# Patient Record
Sex: FEMALE | Race: WHITE | NOT HISPANIC OR LATINO | Employment: UNEMPLOYED | RURAL
[De-identification: names, ages, dates, MRNs, and addresses within clinical notes are randomized per-mention and may not be internally consistent; named-entity substitution may affect disease eponyms.]

---

## 2019-11-06 ENCOUNTER — TRANSCRIBE ORDERS (OUTPATIENT)
Dept: PHYSICAL THERAPY | Facility: CLINIC | Age: 69
End: 2019-11-06

## 2019-11-06 DIAGNOSIS — M51.16 INTERVERTEBRAL DISC DISORDER WITH RADICULOPATHY OF LUMBAR REGION: Primary | ICD-10-CM

## 2019-11-13 ENCOUNTER — TREATMENT (OUTPATIENT)
Dept: PHYSICAL THERAPY | Facility: CLINIC | Age: 69
End: 2019-11-13

## 2019-11-13 DIAGNOSIS — M51.16 LUMBAR DISC HERNIATION WITH RADICULOPATHY: Primary | ICD-10-CM

## 2019-11-13 PROCEDURE — 97110 THERAPEUTIC EXERCISES: CPT | Performed by: PHYSICAL THERAPIST

## 2019-11-13 PROCEDURE — 97162 PT EVAL MOD COMPLEX 30 MIN: CPT | Performed by: PHYSICAL THERAPIST

## 2019-11-13 NOTE — PROGRESS NOTES
Physical Therapy Initial Evaluation and Plan of Care      Patient: Sarai Paige   : 1950  Diagnosis/ICD-10 Code:  Lumbar disc herniation with radiculopathy [M51.16]  Referring practitioner: MELIA Hernandez  Date of Initial Visit: 2019  Today's Date: 2019  Patient seen for 1 sessions    Next MD appt: 2019.  Recertification: 2019           Subjective Questionnaire: Oswestry:  = 22%      Subjective Evaluation    History of Present Illness  Date of surgery: 2019    Subjective comment: Patient reports she has had back issues for years. She reports the pain started in her lower back and down her legs. She reports stairs make it worse. She reports shehad lumbar fusion from Dr. Rowe earlier tis year. She reports the surgery helped wit the LBP, but she still has sciatica down her R LE, but not as much.  Patient Occupation: Volunteer at [employer] Quality of life: good    Pain  Current pain ratin  At best pain ratin  At worst pain ratin  Location: R radicular pain to the knee  Quality: radiating  Relieving factors: medications and rest  Aggravating factors: stairs, ambulation and movement  Progression: improved    Social Support  Lives in: multiple-level home  Lives with: adult children    Diagnostic Tests  X-ray: normal    Treatments  Previous treatment: physical therapy  Current treatment: medication  Patient Goals  Patient goals for therapy: decreased pain             Objective       Static Posture     Head  Forward.    Shoulders  Depressed and rounded.    Thoracic Spine  Hyperkyphosis.    Lumbar Spine   Flattened.     Comments  Pelvis/sacrum are level, no LLD to note.    Postural Observations  Seated posture: poor  Standing posture: poor  Correction of posture: has no consistent effect        Palpation     Right Tenderness of the piriformis.   Trigger point to piriformis.     Neurological Testing     Sensation     Lumbar   Left   Intact: light touch  and hot/cold discrimination    Right   Intact: light touch and hot/cold discrimination    Active Range of Motion     Lumbar   Flexion: 75 degrees   Extension: 20 degrees   Left lateral flexion: WFL  Right lateral flexion: Active right lumbar lateral flexion: 50% of range.   Left rotation: Active left lumbar rotation: 75% of range.   Right rotation: Active right lumbar rotation: 75% of range.     Strength/Myotome Testing     Left Hip   Planes of Motion   Flexion: 4+  Extension: 4+  Abduction: 4+  Adduction: 4+    Right Hip   Planes of Motion   Flexion: 4+  Extension: 4+  Abduction: 4+  Adduction: 4+    Left Knee   Flexion: 5  Extension: 5    Right Knee   Flexion: 5  Extension: 5    Left Ankle/Foot   Dorsiflexion: 5  Plantar flexion: 5    Right Ankle/Foot   Dorsiflexion: 5  Plantar flexion: 5    Tests       Thoracic   Negative slump.     Lumbar     Left   Negative crossed SLR, passive SLR, quadrant and valsalva.     Right   Negative crossed SLR, passive SLR, quadrant and valsalva.     Left Pelvic Girdle/Sacrum   Negative: sacrum compression, gapping and sacral spring.     Right Pelvic Girdle/Sacrum   Negative: sacrum compression, gapping and sacral spring.     Left Hip   Negative HUMAIRA, FADIR, piriformis, SI compression and SI distraction.   SLR: Negative.     Right Hip   Positive HUMAIRA.   Negative FADIR, piriformis, SI compression and SI distraction.   SLR: Negative.     Additional Tests Details  Cece's Symptoms  1) Tenderness- negative  2) Simulation- negative  3) Distraction- negative  4) Regional- negative  5) Overreaction- negative    Ambulation     Observational Gait   Decreased walking speed and stride length.   Left foot contact pattern: foot flat  Right foot contact pattern: foot flat  Base of support: normal    Additional Observational Gait Details  Shuffling type gait.    Quality of Movement During Gait   Trunk  Forward lean.          Assessment & Plan     Assessment  Impairments: abnormal gait, abnormal  or restricted ROM, activity intolerance, lacks appropriate home exercise program and pain with function  Assessment details: Patient presents with chronic low back pain, recent surgery. Still having R sciatic pain with activity, appears to be muscular related due to tight musculature around the hip.  Prognosis: fair  Prognosis details: Barriers to Rehab: Include significant or possible arthritic/degenerative changes that have occurred within the spine, The chronicity of this issue, The patient's obesity.    Safety Issues: None noted.    Functional Limitations: carrying objects, lifting, sleeping, uncomfortable because of pain, sitting, standing and unable to perform repetitive tasks  Goals  Plan Goals: Short Term Goals:  1) I with HEP and have additions/changes by next recertification    2) Patient to be more aware of posture and posture correction techniques    3) AROm B Lumbar SB/ROT WNL    4) Patient able to ambulate aquatically F/R/L 5 min each with no increase in pain.      Long Term Goals:  1) Patient to have AROM for the lumbar spine all WNL, no increase in pain.    2) B LE 5/5.    3) Patient able to perform 20 reps of each aquatic there ex with no increase in pain.    4) Patient able to verbalize proper ergonomics for mopping/sweeping/vacumming after instruction.    5) Patient to report radicular symptoms occur less often and are less intense..    6) I with final aquatic HEP.    7) D/C with a final HEP and free 30 day fitness formula membership.      Plan  Therapy options: will be seen for skilled physical therapy services  Planned modality interventions: hydrotherapy  Planned therapy interventions: abdominal trunk stabilization, body mechanics training, flexibility, functional ROM exercises, home exercise program, transfer training, therapeutic activities, stretching, strengthening, spinal/joint mobilization, soft tissue mobilization, postural training and manual therapy  Frequency: Pool only.  Duration in  visits: 6  Treatment plan discussed with: patient  Plan details: Progress ROM, strength, core stab,through an aquatic program to an I HEP that the patient can continue for long term pain management and spinal protection. 6 aquatic visits after the evaluation.     Other therapeutic activities and/or exercises will be prescribed depending on the patients progress or lack there of.    Visit Diagnoses:    ICD-10-CM ICD-9-CM   1. Lumbar disc herniation with radiculopathy M51.16 722.10     724.4       Timed:  Manual Therapy:         mins  98591;  Therapeutic Exercise:    8     mins  01431;     Neuromuscular No:        mins  25689;    Therapeutic Activity:          mins  31224;     Gait Training:           mins  12956;     Ultrasound:          mins  33245;    Electrical Stimulation:         mins  95018 ( );    Untimed:  Electrical Stimulation:         mins  98064 ( );  Mechanical Traction:         mins  17741;     Timed Treatment:   8   mins   Total Treatment:     30   mins    PT SIGNATURE: Trudi Jesus, CORI DPT, [facility]   DATE TREATMENT INITIATED: 11/13/2019    Initial Certification  Certification Period: 2/11/2020  I certify that the therapy services are furnished while this patient is under my care.  The services outlined above are required by this patient, and will be reviewed every 90 days.     PHYSICIAN: Gloria Mckeon, APRN      DATE:     Please sign and return via fax to  .. Thank you, [facility].

## 2019-11-18 ENCOUNTER — TREATMENT (OUTPATIENT)
Dept: PHYSICAL THERAPY | Facility: CLINIC | Age: 69
End: 2019-11-18

## 2019-11-18 DIAGNOSIS — M51.16 LUMBAR DISC HERNIATION WITH RADICULOPATHY: Primary | ICD-10-CM

## 2019-11-18 PROCEDURE — 97113 AQUATIC THERAPY/EXERCISES: CPT | Performed by: PHYSICAL THERAPIST

## 2019-11-18 NOTE — PROGRESS NOTES
Physical Therapy Daily Progress Note      Patient: Sarai Paige   : 1950  Referring practitioner: MELIA Hernandez  Date of Initial Visit: Type: THERAPY  Noted: 2019  Today's Date: 2019  Patient seen for 2 sessions    Next MD appt: 2019.  Recertification: 2019             Subjective Evaluation    History of Present Illness    Subjective comment: Pt reported that she is scared of water. pt does not want to go to the deep end.Pain  Current pain ratin  Location: low back    Treatments  Current treatment: physical therapy         Objective   See Exercise, Manual, and Modality Logs for complete treatment.       Assessment & Plan     Assessment  Assessment details: No goals met at this time. Pt educated on upright posturing with TA belly muscles tucked. Pt tolerated aquatic therapy without increase pain. Pt felt heavier getting out of the pool per patient reports.    Goals  Plan Goals: Goals  Plan Goals: Short Term Goals:  1) I with HEP and have additions/changes by next recertification (progressing)    2) Patient to be more aware of posture and posture correction techniques (ongoing)    3) AROm B Lumbar SB/ROT WNL (ongoing/progressing)    4) Patient able to ambulate aquatically F/R/L 5 min each with no increase in pain. (progressing)      Long Term Goals:  1) Patient to have AROM for the lumbar spine all WNL, no increase in pain. (progressing)    2) B LE 5/5. (ongoing)    3) Patient able to perform 20 reps of each aquatic there ex with no increase in pain. (ongoing/progressing)    4) Patient able to verbalize proper ergonomics for  mopping/sweeping/vacumming after instruction.    5) Patient to report radicular symptoms occur less often and are less intense..    6) I with final aquatic HEP.    7) D/C with a final HEP and free 30 day fitness formula membership.     Plan  Plan details: Add 3-way UE with YP        Visit Diagnoses:    ICD-10-CM ICD-9-CM   1. Lumbar disc herniation  with radiculopathy M51.16 722.10     724.4       Progress per Plan of Care and Progress strengthening /stabilization /functional activity           Timed:  Manual Therapy:         mins  96503;  Therapeutic Exercise:         mins  98487;     Neuromuscular No:        mins  87857;    Therapeutic Activity:          mins  36440;     Gait Training:           mins  51054;     Ultrasound:          mins  36327;    Electrical Stimulation:         mins  79321 ( );  Aquatic ex                    44 mins  Untimed:  Electrical Stimulation:         mins  84771 ( );  Mechanical Traction:         mins  78486;     Timed Treatment:   44   mins   Total Treatment:     44   mins  Yessenia Elder PTA  Physical Therapist Assistant

## 2019-11-20 ENCOUNTER — TREATMENT (OUTPATIENT)
Dept: PHYSICAL THERAPY | Facility: CLINIC | Age: 69
End: 2019-11-20

## 2019-11-20 DIAGNOSIS — M51.16 LUMBAR DISC HERNIATION WITH RADICULOPATHY: Primary | ICD-10-CM

## 2019-11-20 PROCEDURE — 97113 AQUATIC THERAPY/EXERCISES: CPT | Performed by: PHYSICAL THERAPIST

## 2019-11-20 NOTE — PROGRESS NOTES
Physical Therapy Daily Progress Note      Patient: Sarai Paige   : 1950  Referring practitioner: MELIA Hernandez  Date of Initial Visit: Type: THERAPY  Noted: 2019  Today's Date: 2019  Patient seen for 3 sessions    Next MD appt: 2019.  Recertification: 2019             Subjective Evaluation    History of Present Illness    Subjective comment: Pt stated that she tolerated last treatment in the pool well. Pain  Current pain rating: 3  Location: low back    Treatments  Current treatment: physical therapy         Objective   See Exercise, Manual, and Modality Logs for complete treatment.       Assessment & Plan     Assessment  Assessment details: Pt has met short term goal #4. Pt working toward increase reps this date. Pt tolerated new hang S this date. Pt was will to use floats and hold onto wall at the deep end. Pt required few cues for upright posture with 3-way SLR    Goals  Plan Goals: 1) I with HEP and have additions/changes by next recertification (progressing)    2) Patient to be more aware of posture and posture correction techniques (ongoing/progressing)    3) AROm B Lumbar SB/ROT WNL (ongoing/progressing)    4) Patient able to ambulate aquatically F/R/L 5 min each with no increase in pain. (met)      Long Term Goals:  1) Patient to have AROM for the lumbar spine all WNL, no increase in pain. (progressing)    2) B LE 5/5. (ongoing)    3) Patient able to perform 20 reps of each aquatic there ex with no increase in pain. (ongoing/progressing)    4) Patient able to verbalize proper ergonomics for  mopping/sweeping/vacumming after instruction.    5) Patient to report radicular symptoms occur less often and are less intense..(ongoing/progressing)    6) I with final aquatic HEP.(ongoing/progressing)    7) D/C with a final HEP and free 30 day fitness formula membership.      Plan  Plan details: Add bike if pt will tolerate deep end        Visit Diagnoses:    ICD-10-CM  ICD-9-CM   1. Lumbar disc herniation with radiculopathy M51.16 722.10     724.4       Progress per Plan of Care and Progress strengthening /stabilization /functional activity           Timed:  Manual Therapy:         mins  95672;  Therapeutic Exercise:         mins  36909;     Neuromuscular No:        mins  94339;    Therapeutic Activity:          mins  15615;     Gait Training:           mins  09596;     Ultrasound:          mins  06086;    Electrical Stimulation:         mins  36148 ( );  Aquatic ex                    55 mins  Untimed:  Electrical Stimulation:         mins  88454 ( );  Mechanical Traction:         mins  56798;     Timed Treatment:  55    mins   Total Treatment:     55   mins  [facility]  Physical Therapist Assistant

## 2019-11-25 ENCOUNTER — TREATMENT (OUTPATIENT)
Dept: PHYSICAL THERAPY | Facility: CLINIC | Age: 69
End: 2019-11-25

## 2019-11-25 DIAGNOSIS — M51.16 LUMBAR DISC HERNIATION WITH RADICULOPATHY: Primary | ICD-10-CM

## 2019-11-25 PROCEDURE — 97113 AQUATIC THERAPY/EXERCISES: CPT | Performed by: PHYSICAL THERAPIST

## 2019-11-25 NOTE — PROGRESS NOTES
Physical Therapy Daily Progress Note      Patient: Sarai Paige   : 1950  Referring practitioner: MELIA Hernandez  Date of Initial Visit: Type: THERAPY  Noted: 2019  Today's Date: 2019  Patient seen for 4 sessions    Next MD appt: 2019.  Recertification: 2019                 Subjective Evaluation    History of Present Illness    Subjective comment: pt feels that she can bend better. Pain  Current pain ratin  Location: low back    Treatments  Current treatment: physical therapy         Objective   See Exercise, Manual, and Modality Logs for complete treatment.       Assessment & Plan     Assessment  Assessment details: Pt has met 2 and 5 short term goals. Pt still needed cues on each ex proper technique. Pt working toward goals. Pt able to complete ex 20 reps .     Goals  Plan Goals: at the deep end. Pt required few cues for upright posture with 3-way SLR   Goals  Plan Goals: 1) I with HEP and have additions/changes by next recertification (progressing)    2) Patient to be more aware of posture and posture correction techniques (met)    3) AROm B Lumbar SB/ROT WNL (ongoing/progressing)    4) Patient able to ambulate aquatically F/R/L 5 min each with no increase in pain. (met)      Long Term Goals:  1) Patient to have AROM for the lumbar spine all WNL, no increase in pain. (progressing)    2) B LE 5/5. (ongoing)    3) Patient able to perform 20 reps of each aquatic there ex with no increase in pain. (ongoing/progressing)    4) Patient able to verbalize proper ergonomics for  mopping/sweeping/vacumming after instruction.    5) Patient to report radicular symptoms occur less often and are less intense..(ongoing/progressing)    6) I with final aquatic HEP.(ongoing/progressing)    7) D/C with a final HEP and free 30 day fitness formula membership.       Plan  Plan details: Go over ergonomics verbally next pool visit        Visit Diagnoses:    ICD-10-CM ICD-9-CM   1. Lumbar disc  herniation with radiculopathy M51.16 722.10     724.4       Progress per Plan of Care and Progress strengthening /stabilization /functional activity           Timed:  Manual Therapy:         mins  14090;  Therapeutic Exercise:         mins  88592;     Neuromuscular No:        mins  39535;    Therapeutic Activity:          mins  11658;     Gait Training:           mins  57344;     Ultrasound:          mins  26307;    Electrical Stimulation:         mins  86641 ( );  Aquatic ex                    55 mins  Untimed:  Electrical Stimulation:         mins  68324 ( );  Mechanical Traction:         mins  02806;     Timed Treatment:   55   mins   Total Treatment:    55    mins  Yessenia Elder PTA  Physical Therapist Assistant

## 2019-12-04 ENCOUNTER — TREATMENT (OUTPATIENT)
Dept: PHYSICAL THERAPY | Facility: CLINIC | Age: 69
End: 2019-12-04

## 2019-12-04 DIAGNOSIS — M51.16 LUMBAR DISC HERNIATION WITH RADICULOPATHY: Primary | ICD-10-CM

## 2019-12-04 PROCEDURE — 97113 AQUATIC THERAPY/EXERCISES: CPT | Performed by: PHYSICAL THERAPIST

## 2019-12-04 NOTE — PROGRESS NOTES
Physical Therapy Daily Progress Note    Patient: Sarai Paige   : 1950  Diagnosis/ICD-10 Code:     Diagnosis Plan   1. Lumbar disc herniation with radiculopathy       Referring practitioner: MELIA Hernandez  Date of Initial Visit: Type: THERAPY  Noted: 2019  Today's Date: 2019  Patient seen for 5 sessions      PT Recheck Due: 2019  PT MD Visit: 2019       Sarai Paige        Subjective Evaluation    History of Present Illness    Subjective comment:  patient states that last week she ended up in the hospital. when questioned if she was admitted patient states that she was only in the ER. states that she had a bladder infection. Pain  Current pain ratin             Objective   See Exercise, Manual, and Modality Logs for complete treatment.       Assessment & Plan     Assessment  Assessment details: Patient tolerates completing 20 reps of all aquatics therex this tretament. Able to walk 5 minutes each direction without complaints. Good effort throughout.     Goals  Plan Goals:  Goals: 1) I with HEP and have additions/changes by next recertification (progressing)    2) Patient to be more aware of posture and posture correction techniques (met)    3) AROm B Lumbar SB/ROT WNL (ongoing/progressing)    4) Patient able to ambulate aquatically F/R/L 5 min each with no increase in pain. (met)      Long Term Goals:  1) Patient to have AROM for the lumbar spine all WNL, no increase in pain. (progressing)    2) B LE 5/5. (ongoing)    3) Patient able to perform 20 reps of each aquatic there ex with no increase in pain. (met)    4) Patient able to verbalize proper ergonomics for  mopping/sweeping/vacumming after instruction.    5) Patient to report radicular symptoms occur less often and are less intense..(ongoing/progressing)    6) I with final aquatic HEP.(ongoing/progressing)    7) D/C with a final HEP and free 30 day fitness formula membership.       Plan  Plan details: Verbal review of  ergonomics       Progress strengthening /stabilization /functional activity            Timed:  Manual Therapy:         mins  88791;  Therapeutic Exercise:         mins  93239;   Aquatic Therex :    45    mins  28781    Neuromuscular No:        mins  06816;    Therapeutic Activity:          mins  97259;     Gait Training:           mins  08807;     Ultrasound:          mins  96904;    Electrical Stimulation:         mins  53711 ( );    Untimed:  Electrical Stimulation:         mins  14916 ( );  Mechanical Traction:         mins  59100;   Orthotic fit/train:         mins  82432    Timed Treatment:   45   mins   Total Treatment:     45   mins  Eve Ramirez PTA  Physical Therapist Assistant

## 2019-12-09 ENCOUNTER — TREATMENT (OUTPATIENT)
Dept: PHYSICAL THERAPY | Facility: CLINIC | Age: 69
End: 2019-12-09

## 2019-12-09 DIAGNOSIS — M51.16 LUMBAR DISC HERNIATION WITH RADICULOPATHY: Primary | ICD-10-CM

## 2019-12-09 PROCEDURE — 97113 AQUATIC THERAPY/EXERCISES: CPT | Performed by: PHYSICAL THERAPIST

## 2019-12-09 NOTE — PROGRESS NOTES
Physical Therapy Daily Progress Note    Patient: Sarai Paige   : 1950  Diagnosis/ICD-10 Code:     Diagnosis Plan   1. Lumbar disc herniation with radiculopathy       Referring practitioner: MELIA Hernandez  Date of Initial Visit: Type: THERAPY  Noted: 2019  Today's Date: 2019  Patient seen for 6 sessions      PT Recheck Due: 2019  PT MD Visit: 2019       Sarai Paige        Subjective Evaluation    History of Present Illness    Subjective comment: states that she just feels heavy today         Objective   See Exercise, Manual, and Modality Logs for complete treatment.       Assessment & Plan     Assessment  Assessment details: Patient unable to complete deep hang today secondary to needing to use the restroom at end of treatment time. Good effort but needs a lot of cueing.     Goals  Plan Goals: Short Term Goals:   1) I with HEP and have additions/changes by next recertification (progressing)    2) Patient to be more aware of posture and posture correction techniques (met)    3) AROm B Lumbar SB/ROT WNL (ongoing/progressing)    4) Patient able to ambulate aquatically F/R/L 5 min each with no increase in pain. (met)      Long Term Goals:  1) Patient to have AROM for the lumbar spine all WNL, no increase in pain. (progressing)    2) B LE 5/5. (ongoing)    3) Patient able to perform 20 reps of each aquatic there ex with no increase in pain. (met)    4) Patient able to verbalize proper ergonomics for  mopping/sweeping/vacumming after instruction.    5) Patient to report radicular symptoms occur less often and are less intense..(ongoing/progressing)    6) I with final aquatic HEP.(ongoing/progressing)    7) D/C with a final HEP and free 30 day fitness formula membership.       Plan  Plan details: Next aquatics reattempt deep hang and add deep flutter and deep scissors      Progress strengthening /stabilization /functional activity            Timed:  Manual Therapy:         mins   24117;  Therapeutic Exercise:         mins  43822;   Aquatic Therex :    55    mins  66374    Neuromuscular No:        mins  58109;    Therapeutic Activity:          mins  28481;     Gait Training:           mins  73055;     Ultrasound:          mins  66159;    Electrical Stimulation:         mins  60354 ( );    Untimed:  Electrical Stimulation:         mins  81934 ( );  Mechanical Traction:         mins  41746;   Orthotic fit/train:         mins  69942    Timed Treatment:   55   mins   Total Treatment:     55   mins  Eve Ramirez PTA  Physical Therapist Assistant

## 2019-12-10 ENCOUNTER — TREATMENT (OUTPATIENT)
Dept: PHYSICAL THERAPY | Facility: CLINIC | Age: 69
End: 2019-12-10

## 2019-12-10 DIAGNOSIS — M51.16 LUMBAR DISC HERNIATION WITH RADICULOPATHY: Primary | ICD-10-CM

## 2019-12-10 PROCEDURE — PTNOCHG PR CUSTOM PT NO CHARGE VISIT: Performed by: PHYSICAL THERAPIST

## 2019-12-10 NOTE — PROGRESS NOTES
Physical Therapy Daily Progress Note    Patient: Sarai Paige   : 1950  Diagnosis/ICD-10 Code:     Diagnosis Plan   1. Lumbar disc herniation with radiculopathy       Referring practitioner: MELIA Hernandez  Date of Initial Visit: Type: THERAPY  Noted: 2019  Today's Date: 12/10/2019  Patient seen for 7 sessions      PT Recheck Due: 2019  PT MD Visit: 2019       Sarai Paige reports: that she has eaten something the last couple of days and that is the cause of the rash that is visible on her face.      Subjective   When patient came in to sign in, PTA noticed a severe rash on patient's face that was raised, red with pustules. Patient is questioned about the cause of the rash and patient indicates that it is due to something she has eaten the last few days. PTA attempts to explain to the patient about safety concerning rash and infection risk/control and patient became defensive and would not talk to PTA regarding rash to see if it was anywhere else on her body. Patient hastily exits rehab area to leave without further ability to discuss with patient risks and concerns or the ability to reschedule if needed.     Objective: Patient presents with severe rash on her face that is raised, red and appears to have pustules present. Poor demeanor when PTA attempts to discuss with patient.      Assessment/Plan  Other No treatment today secondary to patient leaving without the ability to discuss her current medical status with rash present. Will continue per plan of care when patient returns as long as there are no open wounds or rash present on her body that would prevent her from being able to enter the pool secondary to infection risk/control.             Timed:  Manual Therapy:         mins  86464;  Therapeutic Exercise:         mins  88976;   Aquatic Therex :        mins  27397    Neuromuscular No:        mins  84628;    Therapeutic Activity:          mins  82185;     Gait Training:            mins  19231;     Ultrasound:          mins  81896;    Electrical Stimulation:         mins  29807 ( );    Untimed:  Electrical Stimulation:         mins  17673 ( );  Mechanical Traction:         mins  32046;   Orthotic fit/train:         mins  47889    Timed Treatment:   0   mins   Total Treatment:     0   mins  Eve Ramirez PTA  Physical Therapist Assistant

## 2019-12-16 ENCOUNTER — TREATMENT (OUTPATIENT)
Dept: PHYSICAL THERAPY | Facility: CLINIC | Age: 69
End: 2019-12-16

## 2019-12-16 DIAGNOSIS — M51.16 LUMBAR DISC HERNIATION WITH RADICULOPATHY: Primary | ICD-10-CM

## 2019-12-16 PROCEDURE — 97530 THERAPEUTIC ACTIVITIES: CPT | Performed by: PHYSICAL THERAPIST

## 2019-12-16 PROCEDURE — 97110 THERAPEUTIC EXERCISES: CPT | Performed by: PHYSICAL THERAPIST

## 2019-12-16 NOTE — PROGRESS NOTES
Re-Assessment / Re-Certification        Patient: Sarai Paige   : 1950  Diagnosis/ICD-10 Code:  Lumbar disc herniation with radiculopathy [M51.16]  Referring practitioner: MELIA Hernandez  Date of Initial Visit: Type: THERAPY  Noted: 2019  Today's Date: 2019  Patient seen for 7 sessions    Next MD appt: PRN.  Recertification: N/A      Subjective:   Sarai Paige reports: 50-75% improvement  Subjective Questionnaire: Oswestry: 1850 = 36%  Clinical Progress: improved  Home Program Compliance: Yes  Treatment has included: therapeutic exercise, therapeutic activity and aquatics    Subjective Evaluation    History of Present Illness    Subjective comment: Patient reports she is just sore, no real pain. She reports she did a lot over the weekend too.Pain  Current pain ratin         Objective       Static Posture     Head  Forward.    Shoulders  Depressed and rounded.    Thoracic Spine  Hyperkyphosis.    Lumbar Spine   Flattened.     Comments  Pelvis/sacrum are level, no LLD to note.    Postural Observations  Seated posture: fair  Standing posture: good  Correction of posture: has no consistent effect        Palpation     Right   No palpable tenderness to the piriformis.     Neurological Testing     Sensation     Lumbar   Left   Intact: light touch and hot/cold discrimination    Right   Intact: light touch and hot/cold discrimination    Active Range of Motion     Lumbar   Flexion: 95 degrees   Extension: 25 degrees   Left lateral flexion: WFL  Right lateral flexion: WFL  Left rotation: WFL  Right rotation: WFL    Strength/Myotome Testing     Left Hip   Planes of Motion   Flexion: 5  Extension: 5  Abduction: 5  Adduction: 5    Right Hip   Planes of Motion   Flexion: 5  Extension: 5  Abduction: 5  Adduction: 5    Left Knee   Flexion: 5  Extension: 5    Right Knee   Flexion: 5  Extension: 5    Left Ankle/Foot   Dorsiflexion: 5  Plantar flexion: 5    Right Ankle/Foot   Dorsiflexion: 5  Plantar  flexion: 5    Tests       Thoracic   Negative slump.     Lumbar     Left   Negative crossed SLR, passive SLR, quadrant and valsalva.     Right   Negative crossed SLR, passive SLR, quadrant and valsalva.     Left Pelvic Girdle/Sacrum   Negative: sacrum compression, gapping and sacral spring.     Right Pelvic Girdle/Sacrum   Negative: sacrum compression, gapping and sacral spring.     Left Hip   Negative HUMAIRA, FADIR, piriformis, SI compression and SI distraction.   SLR: Negative.     Right Hip   Negative HUMAIRA, FADIR, piriformis, SI compression and SI distraction.   SLR: Negative.     Ambulation     Observational Gait   Walking speed and stride length within functional limits.   Left foot contact pattern: heel to toe  Right foot contact pattern: heel to toe  Base of support: normal    Additional Observational Gait Details  Improved overall gait quality.     Assessment & Plan     Assessment  Impairments: pain with function  Assessment details: Unable to get in the pool today secondary to inclement weather. Met all goals at this time but would benfit form an aquatics review to ensure independence. Good ergonomics today and good log roll technique.  Prognosis: fair  Prognosis details: Barriers to Rehab: Include significant or possible arthritic/degenerative changes that have occurred within the spine, The chronicity of this issue, The patient's obesity.    Safety Issues: None noted.    Functional Limitations: unable to perform repetitive tasks  Goals  Plan Goals: Short Term Goals:   1) I with HEP and have additions/changes by next recertification (met)    2) Patient to be more aware of posture and posture correction techniques (met)    3) AROm B Lumbar SB/ROT WNL (met)    4) Patient able to ambulate aquatically F/R/L 5 min each with no increase in pain. (met)      Long Term Goals:  1) Patient to have AROM for the lumbar spine all WNL, no increase in pain. (met)    2) B LE 5/5. (met)    3) Patient able to perform 20 reps  of each aquatic there ex with no increase in pain. (met)    4) Patient able to verbalize proper ergonomics for  mopping/sweeping/vacumming after instruction. (met)    5) Patient to report radicular symptoms occur less often and are less intense.(met)    6) I with final aquatic HEP.(Ongoing))    7) D/C with a final HEP and free 30 day fitness formula membership.  (ongoing)    Plan  Planned modality interventions: hydrotherapy  Planned therapy interventions: home exercise program, stretching, strengthening, postural training and abdominal trunk stabilization  Frequency: Pool only.  Plan details: One more visit aquatics and then D/C to an I program.        Visit Diagnoses:    ICD-10-CM ICD-9-CM   1. Lumbar disc herniation with radiculopathy M51.16 722.10     724.4       Progress toward previous goals: All Met          Recommendations: Discharge at next visit with a final HEP and free 30 day fitness formula mmembership    Prognosis to achieve goals: fair    PT Signature: Trudi Jesus, PT DPT, CSCS        Timed:  Manual Therapy:         mins  27721;  Therapeutic Exercise:    10     mins  53170;     Neuromuscular No:        mins  04325;    Therapeutic Activity:     15     mins  59655;     Gait Training:           mins  18671;     Ultrasound:          mins  79840;    Electrical Stimulation:         mins  58371 ( );    Untimed:  Electrical Stimulation:         mins  93658 ( );  Mechanical Traction:         mins  35616;     Timed Treatment:   25   mins   Total Treatment:     25   mins

## 2019-12-18 ENCOUNTER — TREATMENT (OUTPATIENT)
Dept: PHYSICAL THERAPY | Facility: CLINIC | Age: 69
End: 2019-12-18

## 2019-12-18 DIAGNOSIS — M51.16 LUMBAR DISC HERNIATION WITH RADICULOPATHY: Primary | ICD-10-CM

## 2019-12-18 PROCEDURE — 97113 AQUATIC THERAPY/EXERCISES: CPT | Performed by: PHYSICAL THERAPIST

## 2019-12-18 NOTE — PROGRESS NOTES
Physical Therapy Daily Progress Note/Discharge      Patient: Sarai Paige   : 1950  Referring practitioner: MELIA Hernandez  Date of Initial Visit: Type: THERAPY  Noted: 2019  Today's Date: 2019  Patient seen for 8 sessions      Next MD appt: PRN.  Recertification: N/A        Subjective     Objective   See Exercise, Manual, and Modality Logs for complete treatment.       Assessment & Plan     Assessment  Assessment details: All goals met. Pt I with aquatic ex. Pt able to teach back HEP to PTA. Pt gathered equipment prior to therapy. Pt earned a free 30 day membership to fitness. Pt aware of d/c this date. D/c    Goals  Plan Goals: Plan Goals: Short Term Goals:   1) I with HEP and have additions/changes by next recertification (met)    2) Patient to be more aware of posture and posture correction techniques (met)    3) AROm B Lumbar SB/ROT WNL (met)    4) Patient able to ambulate aquatically F/R/L 5 min each with no increase in pain. (met)      Long Term Goals:  1) Patient to have AROM for the lumbar spine all WNL, no increase in pain. (met)    2) B LE 5/5. (met)    3) Patient able to perform 20 reps of each aquatic there ex with no increase in pain. (met)    4) Patient able to verbalize proper ergonomics for  mopping/sweeping/vacumming after instruction. (met)    5) Patient to report radicular symptoms occur less often and are less intense.(met)    6) I with final aquatic HEP.(met)    7) D/C with a final HEP and free 30 day fitness formula membership.  (met)     Plan  Plan details: D/c        Visit Diagnoses:    ICD-10-CM ICD-9-CM   1. Lumbar disc herniation with radiculopathy M51.16 722.10     724.4                  Timed:  Manual Therapy:         mins  24711;  Therapeutic Exercise:         mins  94065;     Neuromuscular No:        mins  18199;    Therapeutic Activity:          mins  64017;     Gait Training:           mins  35988;     Ultrasound:          mins  35952;    Electrical  Stimulation:         mins  37380 ( );  Aquatic ex                    45  Untimed:  Electrical Stimulation:         mins  42801 ( );  Mechanical Traction:         mins  59379;     Timed Treatment:   45   mins   Total Treatment:    45    mins  Yessenia Elder PTA  Physical Therapist Assistant

## 2020-01-16 ENCOUNTER — TRANSCRIBE ORDERS (OUTPATIENT)
Dept: PHYSICAL THERAPY | Facility: CLINIC | Age: 70
End: 2020-01-16

## 2020-06-19 ENCOUNTER — OFFICE VISIT (OUTPATIENT)
Dept: OTOLARYNGOLOGY | Facility: CLINIC | Age: 70
End: 2020-06-19

## 2020-06-19 VITALS — TEMPERATURE: 98.2 F | BODY MASS INDEX: 37.73 KG/M2 | WEIGHT: 221 LBS | HEIGHT: 64 IN

## 2020-06-19 DIAGNOSIS — J31.0 CHRONIC RHINITIS: Primary | ICD-10-CM

## 2020-06-19 DIAGNOSIS — J34.89 NASAL SEPTAL PERFORATION: ICD-10-CM

## 2020-06-19 DIAGNOSIS — L29.9 ITCHING OF EAR: ICD-10-CM

## 2020-06-19 PROCEDURE — 99203 OFFICE O/P NEW LOW 30 MIN: CPT | Performed by: OTOLARYNGOLOGY

## 2020-06-19 RX ORDER — LORAZEPAM 0.5 MG/1
0.5 TABLET ORAL EVERY 8 HOURS PRN
COMMUNITY

## 2020-06-19 RX ORDER — BUSPIRONE HYDROCHLORIDE 15 MG/1
15 TABLET ORAL 3 TIMES DAILY
COMMUNITY

## 2020-06-19 RX ORDER — AZELASTINE 1 MG/ML
2 SPRAY, METERED NASAL 2 TIMES DAILY
COMMUNITY

## 2020-06-19 RX ORDER — MONTELUKAST SODIUM 10 MG/1
10 TABLET ORAL NIGHTLY
COMMUNITY

## 2020-06-19 RX ORDER — CETIRIZINE HYDROCHLORIDE 10 MG/1
10 TABLET ORAL DAILY
COMMUNITY

## 2020-06-19 RX ORDER — LISINOPRIL 20 MG/1
20 TABLET ORAL DAILY
COMMUNITY

## 2020-06-19 RX ORDER — GLIPIZIDE 2.5 MG/1
2.5 TABLET, EXTENDED RELEASE ORAL DAILY
COMMUNITY

## 2020-06-19 RX ORDER — ACETAMINOPHEN 160 MG
TABLET,DISINTEGRATING ORAL
COMMUNITY

## 2020-06-19 RX ORDER — ATORVASTATIN CALCIUM 40 MG/1
40 TABLET, FILM COATED ORAL DAILY
COMMUNITY

## 2020-06-19 RX ORDER — FLUTICASONE PROPIONATE 50 MCG
2 SPRAY, SUSPENSION (ML) NASAL DAILY
COMMUNITY

## 2020-06-19 RX ORDER — ASPIRIN 81 MG/1
81 TABLET, CHEWABLE ORAL DAILY
COMMUNITY

## 2020-06-19 RX ORDER — SENNOSIDES 8.6 MG
650 CAPSULE ORAL EVERY 8 HOURS PRN
COMMUNITY

## 2020-06-22 NOTE — PROGRESS NOTES
"Subjective   Sarai Paige is a 70 y.o. female.     History of Present Illness   Patient says she has a lot of nasal problems.  Is on CPAP.  Feels like she has a lot of postnasal drainage at night.  Apparently has been told she has a \"hole\" in her nose.  She is currently on Flonase.  Tried Astelin but did not like it.  Is also using Zyrtec and Singulair.  Has a history of nasal trauma as a child.  No formal nasal surgery that she can recall.  In addition to the above she says her ears itch all the time.  They do not drain      The following portions of the patient's history were reviewed and updated as appropriate: allergies, current medications, past family history, past medical history, past social history, past surgical history and problem list.      Sarai Paige reports that she has never smoked. She has never used smokeless tobacco. She reports that she does not drink alcohol or use drugs.  Patient is not a tobacco user and has not been counseled for use of tobacco products    No family history on file.   Positive for cancer and heart disease    Allergies   Allergen Reactions   • Aleve [Naproxen] Other (See Comments)     \"pain in kidneys\"   • Alprazolam Other (See Comments)     \"drowsy\"   • Avelox [Moxifloxacin] Nausea Only and GI Intolerance   • Baycol [Cerivastatin] Dizziness   • Celebrex [Celecoxib] Other (See Comments)     \"Tingling tongue, sleepy\"   • Citalopram Other (See Comments)     Per MD per patient   • Diovan Hct [Valsartan-Hydrochlorothiazide] Other (See Comments)     \"Water retention, tingling tongue\"   • Doxycycline Nausea Only   • Fosamax [Alendronate] Other (See Comments)     \"bad back pain\"   • Hyzaar [Losartan Potassium-Hctz] Swelling   • Ibuprofen Other (See Comments)     \"very tired, falls asleep\"   • Lexapro [Escitalopram Oxalate] Other (See Comments)     Per MD per patient report   • Metoprolol Other (See Comments)     Pain in arms and legs in muscles   • Plendil [Felodipine] Itching " "and Tinnitus   • Proventil [Albuterol] Swelling   • Prozac [Fluoxetine Hcl] Dizziness   • Spironolactone Other (See Comments)     Water retention   • Tequin [Gatifloxacin] Other (See Comments)     Frequent urination, pain in vagina   • Tussionex Pennkinetic Er [Hydrocod Polst-Cpm Polst Er] Other (See Comments)     \"Deep sleep, sluggish\"   • Ultram [Tramadol Hcl] Dizziness   • Biaxin [Clarithromycin] Palpitations   • Ceftin [Cefuroxime Axetil] Rash   • Ciprofloxacin Rash   • Other Other (See Comments)     \"amitex\" - rash  \"atonel\"- heartburn  \"Levoquin\"- muscle aches/pains  \"ophenadrine\"- rapid heart rate, lightheaded  \"paroxetine er\" tingly, dizziness, numb, headache, nausea  \"penincycline\"- rash  \"proproxy\"- nausea, dizzy, anxiety, depressed  \"seldane\" heart palpatations  \"seldane\"- heart palpatations   • Sucralfate Palpitations   • Sulfur Swelling and Rash   • Tetracyclines & Related Rash   • Wellbutrin [Bupropion] Nausea Only and Anxiety         Current Outpatient Medications:   •  acetaminophen (Tylenol 8 Hour Arthritis Pain) 650 MG 8 hr tablet, Take 650 mg by mouth Every 8 (Eight) Hours As Needed for Mild Pain ., Disp: , Rfl:   •  aspirin 81 MG chewable tablet, Chew 81 mg Daily., Disp: , Rfl:   •  atorvastatin (LIPITOR) 40 MG tablet, Take 40 mg by mouth Daily., Disp: , Rfl:   •  azelastine (ASTELIN) 0.1 % nasal spray, 2 sprays into the nostril(s) as directed by provider 2 (Two) Times a Day. Use in each nostril as directed, Disp: , Rfl:   •  busPIRone (BUSPAR) 15 MG tablet, Take 15 mg by mouth 3 (Three) Times a Day., Disp: , Rfl:   •  cetirizine (zyrTEC) 10 MG tablet, Take 10 mg by mouth Daily., Disp: , Rfl:   •  Cholecalciferol (VITAMIN D3) 50 MCG (2000 UT) capsule, Take  by mouth., Disp: , Rfl:   •  fluticasone (FLONASE) 50 MCG/ACT nasal spray, 2 sprays into the nostril(s) as directed by provider Daily., Disp: , Rfl:   •  glipizide (GLUCOTROL XL) 2.5 MG 24 hr tablet, Take 2.5 mg by mouth Daily., Disp: , Rfl: " "  •  lisinopril (PRINIVIL,ZESTRIL) 20 MG tablet, Take 20 mg by mouth Daily., Disp: , Rfl:   •  LORazepam (ATIVAN) 0.5 MG tablet, Take 0.5 mg by mouth Every 8 (Eight) Hours As Needed for Anxiety., Disp: , Rfl:   •  montelukast (SINGULAIR) 10 MG tablet, Take 10 mg by mouth Every Night., Disp: , Rfl:   •  Multiple Vitamins-Calcium (ONE-A-DAY WOMENS PO), Take  by mouth., Disp: , Rfl:   •  Multiple Vitamins-Minerals (AIRBORNE PO), Take  by mouth., Disp: , Rfl:     Past Medical History:   Diagnosis Date   • Allergic    • Anxiety    • Arthritis    • Asthma    • Depression    • Headache    • High blood pressure    • Injury of back    • Injury of neck        No past surgical history on file.  Rotator cuff repair (April 2018)  \"Back surgery\" (February 2019)  Review of Systems   HENT: Positive for ear pain and hearing loss.    Eyes: Positive for itching.   Musculoskeletal: Positive for arthralgias and back pain.   Neurological: Positive for headaches.   Psychiatric/Behavioral:        Not assessed   All other systems reviewed and are negative.          Objective   Physical Exam    General: Well-developed well-nourished female in no acute distress.  Alert and oriented x-3. Head: Normocephalic. Face: Symmetrical strength and appearance. PERRL. EOMI. Voice:Strong. Speech:Fluent  Ears: External ears no deformity, canals no discharge, dry skin is present but no wax or squamous debris.  Beyond this tympanic membranes intact clear and mobile bilaterally.  Nose: Nares show no discharge mass polyp or purulence.  Boggy mucosa is present.  No gross external deformity.  Septum: Large well epithelialized nasal septal perforation without evidence of granulation tissue or neoplasm  Oral cavity: Lips and gums without lesions.  Tongue and floor of mouth without lesions.  Parotid and submandibular ducts unobstructed.  No mucosal lesions on the buccal mucosa or vestibule of the mouth.  Pharynx: No erythema exudate mass or ulcer  Neck: No " lymphadenopathy.  No thyromegaly.  Trachea and larynx midline.  No masses in the parotid or submandibular glands.      Assessment/Plan   Sarai was seen today for nasal congestion.    Diagnoses and all orders for this visit:    Chronic rhinitis    Nasal septal perforation    Itching of ear      Plan: Explained the patient that I did not believe there was a surgical option for her nasal septum that would likely be of benefit.  I advised frequent use of nasal saline spray as well as using Mucinex particularly at night for the postnasal drainage.  Advised using baby oil 2 to 3 drops in the ear canals at bedtime as needed for the itching.  No manipulation with Q-tips or other objects.  May follow-up with me as needed.

## 2021-12-26 ENCOUNTER — EMERGENCY (EMERGENCY)
Facility: HOSPITAL | Age: 71
LOS: 1 days | End: 2021-12-26
Admitting: EMERGENCY MEDICINE
Payer: MEDICARE

## 2021-12-26 PROCEDURE — 74177 CT ABD & PELVIS W/CONTRAST: CPT | Mod: 26

## 2021-12-26 PROCEDURE — 99284 EMERGENCY DEPT VISIT MOD MDM: CPT

## 2021-12-26 PROCEDURE — 71045 X-RAY EXAM CHEST 1 VIEW: CPT | Mod: 26

## 2021-12-26 PROCEDURE — 93010 ELECTROCARDIOGRAM REPORT: CPT

## 2021-12-28 PROBLEM — Z00.00 ENCOUNTER FOR PREVENTIVE HEALTH EXAMINATION: Status: ACTIVE | Noted: 2021-12-28

## 2021-12-29 ENCOUNTER — NON-APPOINTMENT (OUTPATIENT)
Age: 71
End: 2021-12-29

## 2021-12-29 ENCOUNTER — APPOINTMENT (OUTPATIENT)
Dept: UROLOGY | Facility: CLINIC | Age: 71
End: 2021-12-29
Payer: MEDICARE

## 2021-12-29 VITALS
WEIGHT: 220 LBS | HEART RATE: 80 BPM | HEIGHT: 64 IN | BODY MASS INDEX: 37.56 KG/M2 | SYSTOLIC BLOOD PRESSURE: 147 MMHG | TEMPERATURE: 97.9 F | DIASTOLIC BLOOD PRESSURE: 83 MMHG

## 2021-12-29 DIAGNOSIS — G47.33 OBSTRUCTIVE SLEEP APNEA (ADULT) (PEDIATRIC): ICD-10-CM

## 2021-12-29 DIAGNOSIS — Z87.39 PERSONAL HISTORY OF OTHER DISEASES OF THE MUSCULOSKELETAL SYSTEM AND CONNECTIVE TISSUE: ICD-10-CM

## 2021-12-29 DIAGNOSIS — Z98.890 OTHER SPECIFIED POSTPROCEDURAL STATES: ICD-10-CM

## 2021-12-29 DIAGNOSIS — Z86.79 PERSONAL HISTORY OF OTHER DISEASES OF THE CIRCULATORY SYSTEM: ICD-10-CM

## 2021-12-29 DIAGNOSIS — N20.0 CALCULUS OF KIDNEY: ICD-10-CM

## 2021-12-29 DIAGNOSIS — Z87.81 PERSONAL HISTORY OF (HEALED) TRAUMATIC FRACTURE: ICD-10-CM

## 2021-12-29 DIAGNOSIS — N20.1 CALCULUS OF URETER: ICD-10-CM

## 2021-12-29 DIAGNOSIS — Z99.89 OBSTRUCTIVE SLEEP APNEA (ADULT) (PEDIATRIC): ICD-10-CM

## 2021-12-29 DIAGNOSIS — Z87.19 PERSONAL HISTORY OF OTHER DISEASES OF THE DIGESTIVE SYSTEM: ICD-10-CM

## 2021-12-29 DIAGNOSIS — Z87.828 PERSONAL HISTORY OF OTHER (HEALED) PHYSICAL INJURY AND TRAUMA: ICD-10-CM

## 2021-12-29 DIAGNOSIS — Z86.39 PERSONAL HISTORY OF OTHER ENDOCRINE, NUTRITIONAL AND METABOLIC DISEASE: ICD-10-CM

## 2021-12-29 DIAGNOSIS — M21.619 BUNION OF UNSPECIFIED FOOT: ICD-10-CM

## 2021-12-29 DIAGNOSIS — Z87.09 PERSONAL HISTORY OF OTHER DISEASES OF THE RESPIRATORY SYSTEM: ICD-10-CM

## 2021-12-29 DIAGNOSIS — Z98.891 HISTORY OF UTERINE SCAR FROM PREVIOUS SURGERY: ICD-10-CM

## 2021-12-29 DIAGNOSIS — J30.2 OTHER SEASONAL ALLERGIC RHINITIS: ICD-10-CM

## 2021-12-29 DIAGNOSIS — Z86.59 PERSONAL HISTORY OF OTHER MENTAL AND BEHAVIORAL DISORDERS: ICD-10-CM

## 2021-12-29 PROCEDURE — 99203 OFFICE O/P NEW LOW 30 MIN: CPT

## 2021-12-29 RX ORDER — FLUTICASONE PROPIONATE 50 MCG
50 SPRAY, SUSPENSION NASAL DAILY
Refills: 0 | Status: ACTIVE | COMMUNITY

## 2021-12-29 RX ORDER — CETIRIZINE HYDROCHLORIDE 10 MG/1
10 TABLET, CHEWABLE ORAL DAILY
Refills: 0 | Status: ACTIVE | COMMUNITY

## 2021-12-29 RX ORDER — OMEPRAZOLE 20 MG/1
20 CAPSULE, DELAYED RELEASE ORAL DAILY
Refills: 0 | Status: ACTIVE | COMMUNITY

## 2021-12-29 RX ORDER — BUSPIRONE HYDROCHLORIDE 15 MG/1
15 TABLET ORAL TWICE DAILY
Refills: 0 | Status: ACTIVE | COMMUNITY

## 2021-12-29 RX ORDER — LISINOPRIL 20 MG/1
20 TABLET ORAL DAILY
Refills: 0 | Status: ACTIVE | COMMUNITY

## 2021-12-29 RX ORDER — ACETAMINOPHEN 325 MG/1
325 TABLET ORAL EVERY 6 HOURS
Refills: 0 | Status: ACTIVE | COMMUNITY

## 2021-12-29 RX ORDER — MONTELUKAST SODIUM 10 MG/1
10 TABLET, FILM COATED ORAL
Refills: 0 | Status: ACTIVE | COMMUNITY

## 2021-12-29 RX ORDER — ATORVASTATIN CALCIUM 40 MG/1
40 TABLET, FILM COATED ORAL AT BEDTIME
Refills: 0 | Status: ACTIVE | COMMUNITY

## 2021-12-29 RX ORDER — GLIPIZIDE 2.5 MG/1
2.5 TABLET, EXTENDED RELEASE ORAL DAILY
Refills: 0 | Status: ACTIVE | COMMUNITY

## 2021-12-29 RX ORDER — DIAPER,BRIEF,ADULT, DISPOSABLE
EACH MISCELLANEOUS DAILY
Refills: 0 | Status: ACTIVE | COMMUNITY

## 2021-12-29 NOTE — HISTORY OF PRESENT ILLNESS
[FreeTextEntry1] : 72yo F hx of kidney stones presents for 5mm Left UVJ stone\par Pt was in ED 3 days ago with left lower abdominal pain. CT found Left UVJ stone\par Pt reports pain had subsided since that day. \par Denies any fever, chills, abdominal or back pain, or other urinary complaints

## 2022-01-03 ENCOUNTER — APPOINTMENT (OUTPATIENT)
Dept: ULTRASOUND IMAGING | Facility: CLINIC | Age: 72
End: 2022-01-03

## 2022-01-03 ENCOUNTER — APPOINTMENT (OUTPATIENT)
Dept: RADIOLOGY | Facility: CLINIC | Age: 72
End: 2022-01-03